# Patient Record
Sex: MALE | Race: WHITE
[De-identification: names, ages, dates, MRNs, and addresses within clinical notes are randomized per-mention and may not be internally consistent; named-entity substitution may affect disease eponyms.]

---

## 2019-04-17 ENCOUNTER — HOSPITAL ENCOUNTER (OUTPATIENT)
Dept: HOSPITAL 92 - BICULT | Age: 29
Discharge: HOME | End: 2019-04-17
Attending: INTERNAL MEDICINE
Payer: COMMERCIAL

## 2019-04-17 DIAGNOSIS — N13.2: Primary | ICD-10-CM

## 2019-04-17 PROCEDURE — 76770 US EXAM ABDO BACK WALL COMP: CPT

## 2019-04-17 NOTE — ULT
US Renal Bilateral STANDARD: 4/17/2019 12:00 AM



CLINICAL HISTORY: Calculus of the kidney.



STUDY: Renal ultrasound



COMPARISON: None.                  



FINDINGS:



Right kidney: 

Echogenicity: Normal. 

Masses/cysts:  None.   

Hydronephrosis: None. 

Calcifications: None.  

Length: 9.0 cm



Left kidney: 

Echogenicity: Normal. 

Masses/cysts:  None.   

Hydronephrosis: Mild 

Calcifications: None.  

Length: 9.1 cm



Limited visualization of the urinary bladder is unremarkable.



IMPRESSION:

Mild left hydronephrosis. No shadowing calculi identified



Reported By: Joao Stovall 

Electronically Signed:  4/17/2019 8:37 AM